# Patient Record
Sex: MALE | Race: WHITE | NOT HISPANIC OR LATINO | URBAN - METROPOLITAN AREA
[De-identification: names, ages, dates, MRNs, and addresses within clinical notes are randomized per-mention and may not be internally consistent; named-entity substitution may affect disease eponyms.]

---

## 2017-01-05 ENCOUNTER — OUTPATIENT (OUTPATIENT)
Dept: OUTPATIENT SERVICES | Facility: HOSPITAL | Age: 47
LOS: 1 days | End: 2017-01-05
Payer: MEDICARE

## 2017-01-05 ENCOUNTER — APPOINTMENT (OUTPATIENT)
Dept: UROLOGY | Facility: CLINIC | Age: 47
End: 2017-01-05

## 2017-01-05 DIAGNOSIS — R35.0 FREQUENCY OF MICTURITION: ICD-10-CM

## 2017-01-05 PROCEDURE — 76775 US EXAM ABDO BACK WALL LIM: CPT

## 2017-01-18 DIAGNOSIS — N20.0 CALCULUS OF KIDNEY: ICD-10-CM

## 2018-01-09 ENCOUNTER — APPOINTMENT (OUTPATIENT)
Dept: UROLOGY | Facility: CLINIC | Age: 48
End: 2018-01-09
Payer: MEDICARE

## 2018-01-09 ENCOUNTER — OUTPATIENT (OUTPATIENT)
Dept: OUTPATIENT SERVICES | Facility: HOSPITAL | Age: 48
LOS: 1 days | End: 2018-01-09
Payer: MEDICARE

## 2018-01-09 DIAGNOSIS — R35.0 FREQUENCY OF MICTURITION: ICD-10-CM

## 2018-01-09 PROCEDURE — 76775 US EXAM ABDO BACK WALL LIM: CPT

## 2018-01-09 PROCEDURE — 99213 OFFICE O/P EST LOW 20 MIN: CPT | Mod: 25

## 2018-01-09 PROCEDURE — 76775 US EXAM ABDO BACK WALL LIM: CPT | Mod: 26

## 2018-01-17 DIAGNOSIS — N20.0 CALCULUS OF KIDNEY: ICD-10-CM

## 2019-01-10 ENCOUNTER — APPOINTMENT (OUTPATIENT)
Dept: UROLOGY | Facility: CLINIC | Age: 49
End: 2019-01-10

## 2019-02-07 ENCOUNTER — APPOINTMENT (OUTPATIENT)
Dept: UROLOGY | Facility: CLINIC | Age: 49
End: 2019-02-07
Payer: MEDICARE

## 2019-02-07 PROCEDURE — 99213 OFFICE O/P EST LOW 20 MIN: CPT

## 2019-05-14 ENCOUNTER — APPOINTMENT (OUTPATIENT)
Dept: UROLOGY | Facility: CLINIC | Age: 49
End: 2019-05-14
Payer: MEDICARE

## 2019-05-14 ENCOUNTER — OUTPATIENT (OUTPATIENT)
Dept: OUTPATIENT SERVICES | Facility: HOSPITAL | Age: 49
LOS: 1 days | End: 2019-05-14
Payer: MEDICARE

## 2019-05-14 DIAGNOSIS — R35.0 FREQUENCY OF MICTURITION: ICD-10-CM

## 2019-05-14 PROCEDURE — 99213 OFFICE O/P EST LOW 20 MIN: CPT | Mod: 25

## 2019-05-14 PROCEDURE — 76775 US EXAM ABDO BACK WALL LIM: CPT

## 2019-05-14 PROCEDURE — 76775 US EXAM ABDO BACK WALL LIM: CPT | Mod: 26

## 2019-05-22 DIAGNOSIS — N20.0 CALCULUS OF KIDNEY: ICD-10-CM

## 2020-05-12 ENCOUNTER — APPOINTMENT (OUTPATIENT)
Dept: UROLOGY | Facility: CLINIC | Age: 50
End: 2020-05-12

## 2020-07-28 ENCOUNTER — APPOINTMENT (OUTPATIENT)
Dept: UROLOGY | Facility: CLINIC | Age: 50
End: 2020-07-28
Payer: MEDICARE

## 2020-07-28 ENCOUNTER — OUTPATIENT (OUTPATIENT)
Dept: OUTPATIENT SERVICES | Facility: HOSPITAL | Age: 50
LOS: 1 days | End: 2020-07-28
Payer: MEDICARE

## 2020-07-28 VITALS — TEMPERATURE: 98 F

## 2020-07-28 DIAGNOSIS — R35.0 FREQUENCY OF MICTURITION: ICD-10-CM

## 2020-07-28 PROCEDURE — 99214 OFFICE O/P EST MOD 30 MIN: CPT | Mod: 25

## 2020-07-28 PROCEDURE — 76775 US EXAM ABDO BACK WALL LIM: CPT | Mod: 26

## 2020-07-28 PROCEDURE — 76775 US EXAM ABDO BACK WALL LIM: CPT

## 2020-07-29 LAB — PSA SERPL-MCNC: 1.08 NG/ML

## 2020-07-29 NOTE — ASSESSMENT
[FreeTextEntry1] : Reviewed renal sonogram with patient. Nonobstructing 5 mm stone in the lower pole of the right kidney. Parapelvic simple cysts to the left kidney lower pole, 1.9 cm x 0.9 cm and 1.7 cm x 0.9 cm. No hydronephrosis, no solid masses visualized.  Based on these findings, the stone appears stable compared with his prior ultrasound from last year.  I would recommend this continue to be followed on an annual basis.  I have explained to him that the stone is unlikely to get any smaller but preventative measures such as high fluid intake and low-sodium diet can both be helpful to mitigate the risk of increase in the stone burden.  For now I do not think he needs to consider any treatment.\par \par We also did discuss the initiation of prostate cancer screening today.  I have explained that PSA can be used for this purpose although it is a nonspecific test for prostate cancer.  I have explained that if the PSA were to be elevated, this could indicate the potential presence of prostate cancer but could also be indicative of prostate enlargement or inflammation.  If the PSA is elevated and confirmed to be elevated on another study, an additional investigation could be warranted such as imaging or biopsy of the prostate.  He says he does wish to pursue this and I will gather a PSA blood test today from him and let him know the results when they become available.\par \par He will return in 1 year for another ultrasound of his kidneys.\par \par \par PSA level drawn today in office, will call patient to follow up with results

## 2020-07-30 DIAGNOSIS — Z12.5 ENCOUNTER FOR SCREENING FOR MALIGNANT NEOPLASM OF PROSTATE: ICD-10-CM

## 2020-07-30 DIAGNOSIS — N20.0 CALCULUS OF KIDNEY: ICD-10-CM

## 2021-07-27 ENCOUNTER — APPOINTMENT (OUTPATIENT)
Dept: UROLOGY | Facility: CLINIC | Age: 51
End: 2021-07-27
Payer: MEDICARE

## 2021-07-27 ENCOUNTER — OUTPATIENT (OUTPATIENT)
Dept: OUTPATIENT SERVICES | Facility: HOSPITAL | Age: 51
LOS: 1 days | End: 2021-07-27
Payer: MEDICARE

## 2021-07-27 VITALS
BODY MASS INDEX: 29.82 KG/M2 | TEMPERATURE: 97.5 F | HEART RATE: 71 BPM | WEIGHT: 213 LBS | RESPIRATION RATE: 17 BRPM | HEIGHT: 71 IN | SYSTOLIC BLOOD PRESSURE: 106 MMHG | DIASTOLIC BLOOD PRESSURE: 70 MMHG

## 2021-07-27 DIAGNOSIS — Z12.5 ENCOUNTER FOR SCREENING FOR MALIGNANT NEOPLASM OF PROSTATE: ICD-10-CM

## 2021-07-27 DIAGNOSIS — N20.0 CALCULUS OF KIDNEY: ICD-10-CM

## 2021-07-27 DIAGNOSIS — R35.0 FREQUENCY OF MICTURITION: ICD-10-CM

## 2021-07-27 PROCEDURE — 99213 OFFICE O/P EST LOW 20 MIN: CPT | Mod: 25

## 2021-07-27 PROCEDURE — 76775 US EXAM ABDO BACK WALL LIM: CPT

## 2021-07-27 PROCEDURE — 76775 US EXAM ABDO BACK WALL LIM: CPT | Mod: 26

## 2021-07-28 LAB
PSA FREE FLD-MCNC: 32 %
PSA FREE SERPL-MCNC: 0.4 NG/ML
PSA SERPL-MCNC: 1.26 NG/ML

## 2022-07-28 ENCOUNTER — APPOINTMENT (OUTPATIENT)
Dept: UROLOGY | Facility: CLINIC | Age: 52
End: 2022-07-28

## 2022-07-28 ENCOUNTER — OUTPATIENT (OUTPATIENT)
Dept: OUTPATIENT SERVICES | Facility: HOSPITAL | Age: 52
LOS: 1 days | End: 2022-07-28
Payer: MEDICARE

## 2022-07-28 DIAGNOSIS — N20.0 CALCULUS OF KIDNEY: ICD-10-CM

## 2022-07-28 DIAGNOSIS — R35.0 FREQUENCY OF MICTURITION: ICD-10-CM

## 2022-07-28 PROCEDURE — 99213 OFFICE O/P EST LOW 20 MIN: CPT

## 2022-07-28 PROCEDURE — 76775 US EXAM ABDO BACK WALL LIM: CPT | Mod: 26

## 2022-07-28 PROCEDURE — 76775 US EXAM ABDO BACK WALL LIM: CPT

## 2022-11-28 ENCOUNTER — APPOINTMENT (OUTPATIENT)
Dept: ULTRASOUND IMAGING | Facility: CLINIC | Age: 52
End: 2022-11-28

## 2022-11-28 PROCEDURE — 76982 USE 1ST TARGET LESION: CPT | Mod: 59

## 2022-11-28 PROCEDURE — 76700 US EXAM ABDOM COMPLETE: CPT

## 2023-04-03 ENCOUNTER — NON-APPOINTMENT (OUTPATIENT)
Age: 53
End: 2023-04-03

## 2023-05-18 ENCOUNTER — APPOINTMENT (OUTPATIENT)
Dept: UROLOGY | Facility: CLINIC | Age: 53
End: 2023-05-18
Payer: MEDICARE

## 2023-05-18 PROCEDURE — 51798 US URINE CAPACITY MEASURE: CPT

## 2023-05-18 PROCEDURE — 99214 OFFICE O/P EST MOD 30 MIN: CPT

## 2023-05-18 RX ORDER — POTASSIUM CHLORIDE 20 MEQ
20 TABLET, EXT RELEASE, PARTICLES/CRYSTALS ORAL
Refills: 0 | Status: ACTIVE | COMMUNITY

## 2023-05-18 RX ORDER — ICOSAPENT ETHYL 1000 MG/1
1 CAPSULE ORAL
Refills: 0 | Status: ACTIVE | COMMUNITY

## 2023-05-18 RX ORDER — RAMIPRIL 10 MG/1
10 CAPSULE ORAL
Refills: 0 | Status: ACTIVE | COMMUNITY

## 2023-05-18 RX ORDER — ROSUVASTATIN CALCIUM 20 MG/1
20 TABLET, FILM COATED ORAL
Refills: 0 | Status: ACTIVE | COMMUNITY

## 2023-05-18 RX ORDER — CARVEDILOL 12.5 MG/1
12.5 TABLET, FILM COATED ORAL
Refills: 0 | Status: ACTIVE | COMMUNITY

## 2023-05-18 RX ORDER — CHLORTHALIDONE 25 MG/1
25 TABLET ORAL
Refills: 0 | Status: ACTIVE | COMMUNITY

## 2023-05-19 LAB
PSA FREE FLD-MCNC: 39 %
PSA FREE SERPL-MCNC: 0.43 NG/ML
PSA SERPL-MCNC: 1.11 NG/ML

## 2023-05-19 RX ORDER — TAMSULOSIN HYDROCHLORIDE 0.4 MG/1
0.4 CAPSULE ORAL
Qty: 90 | Refills: 3 | Status: ACTIVE | COMMUNITY
Start: 2023-05-18 | End: 1900-01-01

## 2023-05-21 NOTE — HISTORY OF PRESENT ILLNESS
[FreeTextEntry1] : Miguel Jimenez returns to the office today.  He is a 52-year-old man with history of kidney stones.  He has returned today because he is interested in continuing on with prostate cancer screening primarily.  He has not had any symptoms of flank pain or gross hematuria to suggest any new events with stones.  He has started to develop some increased voiding symptoms including waking up a bit more overnight to urinate.  He is averaging about twice per night but waking up as much as 3 times per night.  He does feel empty upon completion.  He does not drink any caffeinated beverages but does drink water and juice.  He has daytime frequency which can sometimes be every 30 to 60 minutes.\par \par

## 2023-05-21 NOTE — ASSESSMENT
[FreeTextEntry1] : I performed a postvoid residual bladder scan today which shows that he does retain some urine, approximately 85 mL.  This would suggest that his frequency is in part related to BPH and bladder outlet obstruction with incomplete bladder emptying.  We discussed how this may contribute to his urinary symptoms and options to consider to manage the symptoms.  I think that in this situation, an alpha-blocker would be the best first bet.  I discussed with him the use of tamsulosin for managing the symptoms as well as its proper use and possible side effects that may be experienced.  He is interested in trying this and I will send him a prescription.\par \par We also reviewed prostate cancer screening.  His prostate examination today shows a smooth and symmetric prostate without any evidence of focal nodularity or induration.  Nothing on exam would suggest the presence of prostate cancer.  I also repeated his PSA level today which is 1.11 ng/mL with 39% free fraction.  This is a very favorable PSA profile and would suggest he is at very low risk for harboring clinically significant prostate cancer.  I would not recommend that he need to pursue any other work-up for prostate screening including either prostate imaging or biopsy at this time.  He will continue to have periodic check in on his PSA, every 1 to 2 years in my viewpoint would be adequate.\par \par He will let me know how he responds to the new prescription with his urinary symptoms and we can discuss potentially increasing the dosage or using other medications in addition depending on his response.

## 2023-12-19 ENCOUNTER — APPOINTMENT (OUTPATIENT)
Dept: DERMATOLOGY | Facility: CLINIC | Age: 53
End: 2023-12-19
Payer: MEDICARE

## 2023-12-19 DIAGNOSIS — D22.9 MELANOCYTIC NEVI, UNSPECIFIED: ICD-10-CM

## 2023-12-19 DIAGNOSIS — Z12.83 ENCOUNTER FOR SCREENING FOR MALIGNANT NEOPLASM OF SKIN: ICD-10-CM

## 2023-12-19 DIAGNOSIS — D23.9 OTHER BENIGN NEOPLASM OF SKIN, UNSPECIFIED: ICD-10-CM

## 2023-12-19 PROCEDURE — 17110 DESTRUCTION B9 LES UP TO 14: CPT

## 2023-12-19 PROCEDURE — 99203 OFFICE O/P NEW LOW 30 MIN: CPT | Mod: 25

## 2023-12-19 NOTE — PHYSICAL EXAM
[FreeTextEntry3] : The patient is well-appearing, in no acute distress, alert and oriented x 3. Mood and affect are normal. A complete cutaneous examination of the scalp, face, neck, chest, abdomen, back, bilateral arms, bilateral legs, buttocks, digits, nails, eyelids, conjunctiva and lips reveals the following significant findings:  verrucous papule on vertex scalp , R cheek.  numerous homogenous brown macules on face, trunk, extremities firm pink papule on R dorsal forearm

## 2023-12-19 NOTE — HISTORY OF PRESENT ILLNESS
[FreeTextEntry1] : np growths [de-identified] : Mr. KUMAR ROCHA  is a 53 year old M here with sister for evaluation of below  #asx bump on scalp noted by his sister in may 2023 #asx bump on R cheek x a month   no personal or family h/o skin cancer

## 2023-12-19 NOTE — ASSESSMENT
[FreeTextEntry1] : We have discussed the nature and usual course of these lesions. After obtaining verbal consent, 2 lesions were treated with liquid nitrogen for several seconds, with a ~10 second thaw interval for a total of 2 cycles. The patient was informed of the potential for erythema, blister formation, hypo- or hyperpigmentation, and scar at the site of treatment. wound care discussed - vaseline for any irritation/soreness/blistering. The patient tolerated the treatment well and knows to return if the lesions do not resolve.  #numerous benign nevi #DF, R forearm - Counseled about clinically and dermatoscopically benign lesions - No treatment indicated at this time - Counseled patient to notify us of any changes  - TBSE performed today - Advised sun protection, broad spectrum SPF 30 or higher daily and reapply often, sun protective clothing - Counseled patient to monitor for changes - rtc q1yr for repeat skin exam or sooner if new/concerning lesion

## 2024-01-08 ENCOUNTER — LABORATORY RESULT (OUTPATIENT)
Age: 54
End: 2024-01-08

## 2024-01-09 ENCOUNTER — APPOINTMENT (OUTPATIENT)
Dept: DERMATOLOGY | Facility: CLINIC | Age: 54
End: 2024-01-09
Payer: MEDICARE

## 2024-01-09 DIAGNOSIS — L91.8 OTHER HYPERTROPHIC DISORDERS OF THE SKIN: ICD-10-CM

## 2024-01-09 DIAGNOSIS — D48.5 NEOPLASM OF UNCERTAIN BEHAVIOR OF SKIN: ICD-10-CM

## 2024-01-09 DIAGNOSIS — B07.9 VIRAL WART, UNSPECIFIED: ICD-10-CM

## 2024-01-09 PROCEDURE — 11102 TANGNTL BX SKIN SINGLE LES: CPT | Mod: 59

## 2024-01-09 PROCEDURE — 11103 TANGNTL BX SKIN EA SEP/ADDL: CPT | Mod: 59

## 2024-01-09 PROCEDURE — 17110 DESTRUCTION B9 LES UP TO 14: CPT

## 2024-01-09 NOTE — PHYSICAL EXAM
[FreeTextEntry3] : Focused skin exam performed  The relevant portions of the exam were performed today  AAOx3, NAD, well-appearing / pleasant Focused examination within normal limits with the exception of: -vertex scalp , R cheek clear, no residual lesion - b/l axillae with multiple fleshy papules

## 2024-01-09 NOTE — HISTORY OF PRESENT ILLNESS
[FreeTextEntry1] : np growths [de-identified] : Mr. KUMAR ROCHA  is a 53 year old M here with sister for evaluation of below  #VV on scalp and cheek - resolved s/p LN2 at [facility] #asking about removal options for bothersome growths on b/l axillae   no personal or family h/o skin cancer

## 2024-01-09 NOTE — ASSESSMENT
[FreeTextEntry1] : VV resolved s/p 1 tx of LN2   NUBS x 2  R axillae superior, R axillae inferior acrochordon v IDN for both Biopsy by Shave  The risks/benefits/alternatives of skin biopsy were explained to the patient, which include and are not limited to bleeding, infection, scarring or discoloration of skin, and recurrence of lesion. Patient expressed understanding of these risks and provided consent to the procedure. Time out with verification of patient and lesion site was performed. Site was prepped with rubbing alcohol, lidocaine with epinephrine was injected for anesthesia, and biopsy was performed. Specimen sent to path. Procedure was without complication and well tolerated. Wound care was discussed.  Inflamed acrochordon We have discussed the nature and usual course of these lesions. After obtaining verbal consent, 3 lesions were treated with liquid nitrogen for several seconds, with a ~10 second thaw interval for a total of 2 cycles. The patient was informed of the potential for erythema, blister formation, hypo- or hyperpigmentation, and scar at the site of treatment. wound care discussed - vaseline for any irritation/soreness/blistering. The patient tolerated the treatment well and knows to return if the lesions do not resolve.

## 2024-01-24 ENCOUNTER — APPOINTMENT (OUTPATIENT)
Dept: ULTRASOUND IMAGING | Facility: CLINIC | Age: 54
End: 2024-01-24
Payer: MEDICARE

## 2024-01-24 PROCEDURE — 76700 US EXAM ABDOM COMPLETE: CPT

## 2024-01-24 PROCEDURE — 76981 USE PARENCHYMA: CPT | Mod: XS

## 2024-05-12 PROBLEM — N20.0 NEPHROLITHIASIS: Status: ACTIVE | Noted: 2022-07-29

## 2024-05-12 PROBLEM — Z12.5 PROSTATE CANCER SCREENING: Status: ACTIVE | Noted: 2020-07-28

## 2024-05-16 ENCOUNTER — OUTPATIENT (OUTPATIENT)
Dept: OUTPATIENT SERVICES | Facility: HOSPITAL | Age: 54
LOS: 1 days | End: 2024-05-16

## 2024-05-16 ENCOUNTER — APPOINTMENT (OUTPATIENT)
Dept: UROLOGY | Facility: CLINIC | Age: 54
End: 2024-05-16
Payer: MEDICARE

## 2024-05-16 DIAGNOSIS — Z12.5 ENCOUNTER FOR SCREENING FOR MALIGNANT NEOPLASM OF PROSTATE: ICD-10-CM

## 2024-05-16 DIAGNOSIS — N13.8 BENIGN PROSTATIC HYPERPLASIA WITH LOWER URINARY TRACT SYMPMS: ICD-10-CM

## 2024-05-16 DIAGNOSIS — N40.1 BENIGN PROSTATIC HYPERPLASIA WITH LOWER URINARY TRACT SYMPMS: ICD-10-CM

## 2024-05-16 DIAGNOSIS — N20.0 CALCULUS OF KIDNEY: ICD-10-CM

## 2024-05-16 PROCEDURE — 76775 US EXAM ABDO BACK WALL LIM: CPT

## 2024-05-16 PROCEDURE — 99214 OFFICE O/P EST MOD 30 MIN: CPT

## 2024-05-16 PROCEDURE — 76775 US EXAM ABDO BACK WALL LIM: CPT | Mod: 26

## 2024-05-16 PROCEDURE — G2211 COMPLEX E/M VISIT ADD ON: CPT

## 2024-05-17 LAB
PSA FREE FLD-MCNC: 38 %
PSA FREE SERPL-MCNC: 0.46 NG/ML
PSA SERPL-MCNC: 1.22 NG/ML

## 2024-05-18 PROBLEM — N40.1 BENIGN LOCALIZED HYPERPLASIA OF PROSTATE WITH URINARY OBSTRUCTION: Status: ACTIVE | Noted: 2023-05-18

## 2024-05-18 NOTE — HISTORY OF PRESENT ILLNESS
[FreeTextEntry1] : Miguel Jimenez returns to the office today.  He is 53 years old with history of kidney stones.  I last saw him for ultrasound in 2022 and at that time there was an echogenic structure at the lower aspect of the right kidney measuring about 6.4 mm in size.  This was likely small kidney stone.  He has not developed any interval symptoms of hematuria or flank pain.  He also reports no bothersome lower urinary tract symptoms at this time such as urgency or frequency.  There is no incontinence.  He denies any weakness of the stream, hesitancy, or sensation of incomplete bladder emptying.  He does have nocturia x 1 or 2.  This is not of much bother to him at this time.  He has tried to reduce his soda intake to minimize the nocturia which seems to have been helpful.  Last PSA level in May 2023 was 1.11 ng/mL.  This was stable from prior levels and he has never had a prior elevation.

## 2024-05-18 NOTE — ASSESSMENT
[FreeTextEntry1] : He will continue to minimize nighttime fluid intake to minimize his nocturia.  I do not think he needs any medical intervention for urinary symptoms at this point.  Ultrasound reassessment of the kidneys was done today continuing to show an echogenic focus at the lower aspect of the right kidney about 7 mm in size and consistent with his prior findings.  This is likely a small nonobstructing stone but given the absence of symptoms and still relatively small size I do not think he needs to consider any immediate intervention.  I will follow this with another ultrasound next year and advised him to continue to hydrate and minimize sodium intake in his diet to help minimize risk of stone progression.  The PSA level repeated today was 1.22 ng/mL continuing to show that he is at low risk for harboring or developing clinically significant prostate cancer.  I will check him again with the PSA level when he returns for his next follow-up.

## 2024-05-28 DIAGNOSIS — R35.0 FREQUENCY OF MICTURITION: ICD-10-CM
